# Patient Record
Sex: FEMALE | Race: WHITE | NOT HISPANIC OR LATINO | Employment: UNEMPLOYED | URBAN - METROPOLITAN AREA
[De-identification: names, ages, dates, MRNs, and addresses within clinical notes are randomized per-mention and may not be internally consistent; named-entity substitution may affect disease eponyms.]

---

## 2020-09-01 ENCOUNTER — TRANSFERRED RECORDS (OUTPATIENT)
Dept: MULTI SPECIALTY CLINIC | Facility: CLINIC | Age: 50
End: 2020-09-01

## 2020-09-01 LAB
HPV ABSTRACT: NORMAL
PAP-ABSTRACT: NORMAL

## 2021-11-21 ENCOUNTER — ALLIED HEALTH/NURSE VISIT (OUTPATIENT)
Dept: FAMILY MEDICINE | Facility: OTHER | Age: 51
End: 2021-11-21
Attending: FAMILY MEDICINE
Payer: OTHER GOVERNMENT

## 2021-11-21 DIAGNOSIS — R51.9 HEADACHE: ICD-10-CM

## 2021-11-21 DIAGNOSIS — R50.9 FEVER: ICD-10-CM

## 2021-11-21 DIAGNOSIS — R05.9 COUGH: Primary | ICD-10-CM

## 2021-11-21 PROCEDURE — C9803 HOPD COVID-19 SPEC COLLECT: HCPCS

## 2021-11-21 PROCEDURE — U0005 INFEC AGEN DETEC AMPLI PROBE: HCPCS | Mod: ZL

## 2021-11-22 LAB — SARS-COV-2 RNA RESP QL NAA+PROBE: POSITIVE

## 2021-12-12 ENCOUNTER — HEALTH MAINTENANCE LETTER (OUTPATIENT)
Age: 51
End: 2021-12-12

## 2022-10-03 ENCOUNTER — HEALTH MAINTENANCE LETTER (OUTPATIENT)
Age: 52
End: 2022-10-03

## 2023-02-11 ENCOUNTER — HEALTH MAINTENANCE LETTER (OUTPATIENT)
Age: 53
End: 2023-02-11

## 2023-02-26 ENCOUNTER — OFFICE VISIT (OUTPATIENT)
Dept: FAMILY MEDICINE | Facility: OTHER | Age: 53
End: 2023-02-26
Payer: OTHER GOVERNMENT

## 2023-02-26 VITALS
SYSTOLIC BLOOD PRESSURE: 122 MMHG | WEIGHT: 184.5 LBS | DIASTOLIC BLOOD PRESSURE: 80 MMHG | HEIGHT: 66 IN | RESPIRATION RATE: 16 BRPM | TEMPERATURE: 98.1 F | HEART RATE: 62 BPM | BODY MASS INDEX: 29.65 KG/M2 | OXYGEN SATURATION: 96 %

## 2023-02-26 DIAGNOSIS — H66.011 NON-RECURRENT ACUTE SUPPURATIVE OTITIS MEDIA OF RIGHT EAR WITH SPONTANEOUS RUPTURE OF TYMPANIC MEMBRANE: ICD-10-CM

## 2023-02-26 DIAGNOSIS — J06.9 VIRAL URI: Primary | ICD-10-CM

## 2023-02-26 LAB
FLUAV RNA SPEC QL NAA+PROBE: NEGATIVE
FLUBV RNA RESP QL NAA+PROBE: NEGATIVE
RSV RNA SPEC NAA+PROBE: NEGATIVE
SARS-COV-2 RNA RESP QL NAA+PROBE: NEGATIVE

## 2023-02-26 PROCEDURE — C9803 HOPD COVID-19 SPEC COLLECT: HCPCS

## 2023-02-26 PROCEDURE — 99203 OFFICE O/P NEW LOW 30 MIN: CPT | Mod: CS

## 2023-02-26 PROCEDURE — 87637 SARSCOV2&INF A&B&RSV AMP PRB: CPT | Mod: ZL

## 2023-02-26 RX ORDER — LEFLUNOMIDE 20 MG/1
20 TABLET ORAL DAILY
COMMUNITY
Start: 2023-01-07

## 2023-02-26 RX ORDER — VITAMIN B COMPLEX
1 TABLET ORAL
COMMUNITY

## 2023-02-26 RX ORDER — BUPROPION HYDROCHLORIDE 300 MG/1
300 TABLET ORAL DAILY
COMMUNITY
Start: 2021-08-24

## 2023-02-26 RX ORDER — PROGESTERONE 100 MG/1
100 CAPSULE ORAL DAILY
COMMUNITY
Start: 2023-02-09

## 2023-02-26 RX ORDER — UBIDECARENONE 100 MG
100 CAPSULE ORAL
COMMUNITY

## 2023-02-26 RX ORDER — QUINIDINE GLUCONATE 324 MG
240 TABLET, EXTENDED RELEASE ORAL
COMMUNITY

## 2023-02-26 ASSESSMENT — PAIN SCALES - GENERAL: PAINLEVEL: WORST PAIN (10)

## 2023-02-26 NOTE — PROGRESS NOTES
ASSESSMENT/PLAN:    (J06.9) Viral URI  (primary encounter diagnosis)  Comment: Patient with viral URI symptoms, she reports she has improved since initial onset but has ongoing fever.  Concurrent AOM was noted.  Patient's vital signs were stable and bilateral lung sounds were clear.  At this point I recommend symptomatic treatment.  Multiplex is pending.  Plan: Symptomatic Influenza A/B & SARS-CoV2         (COVID-19) Virus PCR Multiplex Nose  Symptomatic treatment - Encouraged fluids, salt water gargles, honey (only if greater than 1 year in age due to risk of botulism), elevation, humidifier, sinus rinse/netti pot, lozenges, tea, topical vapor rub, popsicles, rest, etc     (H66.011) Non-recurrent acute suppurative otitis media of right ear with spontaneous rupture of tympanic membrane  Comment: Patient with right-sided otalgia for 2 days.  She has had a fever for 5 days.  Right TM with mild bulge and purulence.  At this point I do recommend antibiotic therapy due to otalgia and fever.  Plan: amoxicillin-clavulanate (AUGMENTIN) 875-125 MG         tablet    Please take your antibiotics as ordered. Complete the full dose even if you are feeling better. You may take your antibiotics with food.     You may take a daily probiotic while on antibiotics.    Follow up if symptoms are worsening or if symptoms are not improving within 2 days of starting antibiotics.     May use over-the-counter Tylenol or ibuprofen PRN    Discussed warning signs/symptoms indicative of need to f/u    Follow up if symptoms persist or worsen or concerns    I have reviewed the nursing notes.  I have reviewed the findings, diagnosis, plan and need for follow up with the patient.    I explained my diagnostic considerations and recommendations to the patient, who voiced understanding and agreement with the treatment plan. All questions were answered. We discussed potential side effects of any prescribed or recommended therapies, as well as  expectations for response to treatments.    FELICIANO EDMOND CARROL, SINGH CNP  2/26/2023  9:40 AM    HPI:    Sue Herrera is a 52 year old female  who presents to [facility] today for concerns of nausea and otalgia.     Patient has had a fever, headache, diarrhea, nausea, otalgia, and sinus pain. She has had a fever under 102 for the past 5 days. Four days ago she had a headache and body aches. She has some sinus pressure. Ear pain started two days ago. Diarrhea started 3 days ago. She feels as though she is on the mend aside from the ear ache which she describes as stabbing pain. No cough.     No known medication allergies.       No past medical history on file.  History reviewed. No pertinent surgical history.  Social History     Tobacco Use     Smoking status: Never     Passive exposure: Never     Smokeless tobacco: Never   Substance Use Topics     Alcohol use: Yes     Comment: occ     Current Outpatient Medications   Medication Sig Dispense Refill     amoxicillin-clavulanate (AUGMENTIN) 875-125 MG tablet Take 1 tablet by mouth 2 times daily for 7 days 14 tablet 0     buPROPion (WELLBUTRIN XL) 300 MG 24 hr tablet Take 300 mg by mouth daily       co-enzyme Q-10 100 MG CAPS capsule Take 100 mg by mouth       Ferrous Gluconate 240 (27 Fe) MG TABS Take 240 mg by mouth       leflunomide (ARAVA) 20 MG tablet Take 20 mg by mouth daily       progesterone (PROMETRIUM) 100 MG capsule Take 100 mg by mouth daily       Vitamin D3 (CHOLECALCIFEROL) 25 mcg (1000 units) tablet Take 1 tablet by mouth       Allergies   Allergen Reactions     Cats Itching and Other (See Comments)     Itching ears     Dogs Itching and Other (See Comments)     Itchy ears     Dust Mite Extract Other (See Comments) and Itching     Congestion and stuffiness. Takes zyrtec and sudafed daily.       Molds & Smuts Other (See Comments)     Congestion and stuffiness. Takes zyrtec and sudafed daily.       Past medical history, past surgical history, current  "medications and allergies reviewed and accurate to the best of my knowledge.      ROS:  Refer to HPI    /80 (BP Location: Right arm, Patient Position: Sitting, Cuff Size: Adult Large)   Pulse 62   Temp 98.1  F (36.7  C) (Tympanic)   Resp 16   Ht 1.676 m (5' 6\")   Wt 83.7 kg (184 lb 8 oz)   SpO2 96%   BMI 29.78 kg/m      EXAM:  General Appearance: Well appearing 52 year old female, appropriate appearance for age. No acute distress   Ears: Left TM intact with retraction, translucent with bony landmarks appreciated, no erythema, no effusion, no bulging, no purulence.  Right TM intact, with mild bulging and purulence.  Left auditory canal clear.  Right auditory canal clear.  Normal external ears, non tender.  Eyes: conjunctivae normal without erythema or irritation, corneas clear, no drainage or crusting, no eyelid swelling, pupils equal   Oropharynx: moist mucous membranes, posterior pharynx with mild erythema,no exudates or petechiae, no post nasal drip seen, no trismus, voice clear.    Sinuses:  No sinus tenderness upon palpation of the frontal or maxillary sinuses  Nose:  Bilateral nares: no erythema, no edema, no drainage or congestion   Neck: supple without adenopathy  Respiratory: normal chest wall and respirations.  Normal effort.  Clear to auscultation bilaterally, no wheezing, crackles or rhonchi.  No increased work of breathing.  No cough appreciated.  Cardiac: RRR with no murmurs  Abdomen: soft, nontender, no rigidity, no rebound tenderness or guarding, normal bowel sounds present   Musculoskeletal:  Equal movement of bilateral upper extremities.  Equal movement of bilateral lower extremities.  Normal gait.  No neck pain or stiffness.  Dermatological: no rashes noted of exposed skin  Neuro: Alert and oriented to person, place, and time.  Cranial nerves II-XII grossly intact with no focal or lateralizing deficits.  Muscle tone normal.  Gait normal. No tremor.   Psychological: normal affect, " alert, oriented, and pleasant.     Labs:  Multiplex pending.

## 2023-02-26 NOTE — PATIENT INSTRUCTIONS
You have an ear infection (acute otitis media).     Please take your antibiotics as ordered. Complete the full dose even if you are feeling better. You may take your antibiotics with food.     You may take a daily probiotic while on antibiotics.    Follow up if symptoms are worsening or if symptoms are not improving within 2 days of starting antibiotics.       For Viral Symptoms  Symptomatic treatment - Encouraged fluids, salt water gargles, honey (only if greater than 1 year in age due to risk of botulism), elevation, humidifier, sinus rinse/netti pot, lozenges, tea, topical vapor rub, popsicles, rest, etc

## 2023-02-26 NOTE — NURSING NOTE
Pt here for fevers up to 101.8 since Tuesday night.  Wednesday woke up with a HA and body aches.  Lots of sinus pressure, eye pain.   Right ear pain since Friday.  Worse yesterday.  Had diarrhea since Thursday.  Negative home COVID test Thursday.    Catherine Méndez CMA ([facility])......................2/26/2023  9:36 AM       Medication Reconciliation: complete    Catherine Méndez CMA  2/26/2023 9:36 AM

## 2023-05-31 ENCOUNTER — TRANSFERRED RECORDS (OUTPATIENT)
Dept: MULTI SPECIALTY CLINIC | Facility: CLINIC | Age: 53
End: 2023-05-31
Payer: OTHER GOVERNMENT

## 2023-05-31 LAB — COLOGUARD-ABSTRACT: NEGATIVE

## 2024-03-09 ENCOUNTER — HEALTH MAINTENANCE LETTER (OUTPATIENT)
Age: 54
End: 2024-03-09

## 2025-03-16 ENCOUNTER — HEALTH MAINTENANCE LETTER (OUTPATIENT)
Age: 55
End: 2025-03-16

## 2025-08-10 ENCOUNTER — HEALTH MAINTENANCE LETTER (OUTPATIENT)
Age: 55
End: 2025-08-10